# Patient Record
Sex: MALE
[De-identification: names, ages, dates, MRNs, and addresses within clinical notes are randomized per-mention and may not be internally consistent; named-entity substitution may affect disease eponyms.]

---

## 2019-03-05 PROBLEM — Z00.00 ENCOUNTER FOR PREVENTIVE HEALTH EXAMINATION: Status: ACTIVE | Noted: 2019-03-05

## 2019-04-02 ENCOUNTER — APPOINTMENT (OUTPATIENT)
Dept: OTOLARYNGOLOGY | Facility: CLINIC | Age: 49
End: 2019-04-02
Payer: COMMERCIAL

## 2019-04-02 DIAGNOSIS — Z85.9 PERSONAL HISTORY OF MALIGNANT NEOPLASM, UNSPECIFIED: ICD-10-CM

## 2019-04-02 DIAGNOSIS — C73 MALIGNANT NEOPLASM OF THYROID GLAND: ICD-10-CM

## 2019-04-02 PROCEDURE — 99213 OFFICE O/P EST LOW 20 MIN: CPT | Mod: 25

## 2019-04-02 RX ORDER — LEVOTHYROXINE SODIUM 0.17 MG/1
TABLET ORAL
Refills: 0 | Status: ACTIVE | COMMUNITY

## 2019-04-02 RX ORDER — LISINOPRIL 30 MG/1
TABLET ORAL
Refills: 0 | Status: ACTIVE | COMMUNITY

## 2019-04-02 NOTE — CONSULT LETTER
[Dear  ___] : Dear  [unfilled], [Courtesy Letter:] : I had the pleasure of seeing your patient, [unfilled], in my office today. [Please see my note below.] : Please see my note below. [Sincerely,] : Sincerely, [FreeTextEntry3] : Joe Oneil MD

## 2019-04-02 NOTE — PROCEDURE
[de-identified] : PROCEDURE: Flexible laryngoscopy\par SURGEON: Dr. Oneil\par INDICATIONS: He was unable to tolerate a mirror exam. Assess for laryngopharynx pharyngeal reflux. cough. head and neck mass. \par ANESTHESIA: The patient was placed in a sitting position.  Following application of the topical anesthetic and decongestant, exam was performed with a flexible scope.  The scope was passed along the right nasal floor to the nasopharynx.  It was then passed into the region of the middle meatus, middle turbinate, and sphenoethmoid region.  An identical procedure was performed on the left side.  The following findings were noted:\par \par The nasal musoca was healthy appearing and the septum was roughly midline. The middle meatus and sphenoethmoid recesses were clear bilaterally. The nasopharynx \par \par Nasopharynx: no masses, choanae patent, no adenoid tissue\par \par Base of tongue/vallecula: no masses or asymmetry\par Pharyngeal walls: symmetrical. No masses\par Pyriform sinuses: no lesions or pooling of secretions\par Epiglottis: normal. No edema or lesions\par Aryepiglottic folds: normal. No lesions. \par Vocal cords: clear and mobile. No lesions. Airway patent\par Arytenoids: no edema or erythema \par Interarytenoid area: no edema, erythema or lesion.\par \par

## 2019-04-02 NOTE — HISTORY OF PRESENT ILLNESS
[de-identified] : This gentleman is well-known to me. He is now approximately 1-1/2 years status post total thyroidectomy for a stage III papillary thyroid adenocarcinoma. He is without any complaints. He did undergo radioactive iodine ablation.\par I reviewed a recent sonogram from February of 2019 which did not demonstrate any concerning lymphadenopathy or any tissue in the thyroid bed\par He is followed and careful endocrinology care by Dr. Leslie Geiger. I do not have any recent blood work but according to him recent blood work was performed and thought to be satisfactory

## 2019-04-02 NOTE — ASSESSMENT
[FreeTextEntry1] : He is clinically stable without any evidence of disease at this time. I will make arrangements to review his recent serology. He will continue to follow an endocrinology care. He was seen in followup in 6 months time or as needed.

## 2019-04-04 ENCOUNTER — APPOINTMENT (OUTPATIENT)
Age: 49
End: 2019-04-04
Payer: COMMERCIAL

## 2019-04-04 ENCOUNTER — APPOINTMENT (OUTPATIENT)
Dept: OTOLARYNGOLOGY | Facility: CLINIC | Age: 49
End: 2019-04-04
Payer: COMMERCIAL

## 2019-04-04 DIAGNOSIS — H90.3 SENSORINEURAL HEARING LOSS, BILATERAL: ICD-10-CM

## 2019-04-04 PROCEDURE — 92557 COMPREHENSIVE HEARING TEST: CPT

## 2019-04-04 PROCEDURE — 99213 OFFICE O/P EST LOW 20 MIN: CPT

## 2019-04-04 PROCEDURE — 92567 TYMPANOMETRY: CPT

## 2019-04-04 NOTE — CONSULT LETTER
[Dear  ___] : Dear  [unfilled], [Courtesy Letter:] : I had the pleasure of seeing your patient, [unfilled], in my office today. [Please see my note below.] : Please see my note below. [Consult Closing:] : Thank you very much for allowing me to participate in the care of this patient.  If you have any questions, please do not hesitate to contact me. [Sincerely,] : Sincerely, [FreeTextEntry3] : Mary Strange MD\par

## 2019-04-04 NOTE — HISTORY OF PRESENT ILLNESS
[de-identified] : Chip Hinton is a very pleasant 48-year-old patient with a history of thyroid cancer and bilateral sensorineural hearing loss. I saw him in August of 2017 for hearing loss. He had bilateral hearing loss. I also found an incidental right thyroid mass. He underwent workup and was found to have thyroid cancer. He underwent thyroidectomy with Dr. Oneil followed by radioactive iodine ablation. He saw Dr. Oneil recently. He is also followed by an endocrinologist. He denies tinnitus, otalgia, otorrhea, or dizziness.\par \par He has no history of recurrent ear infections or prior otologic surgery. He did have some noise exposure when younger from going to concerts. His last hearing test was in 2017.

## 2019-04-04 NOTE — ASSESSMENT
[FreeTextEntry1] : He has a history of bilateral sensorineural hearing loss. We reviewed his audiogram\par \par PLAN\par \par -findings and management options discussed in detail with the patient. \par -good aural hygiene\par -avoid using cotton swabs in the ears\par -noise precautions\par -annual audiogram\par -consider HAE if he has difficulty hearing\par -follow up in one year to check his hearing\par -call and return earlier if any concerns. \par

## 2019-05-13 ENCOUNTER — APPOINTMENT (OUTPATIENT)
Dept: OTOLARYNGOLOGY | Facility: CLINIC | Age: 49
End: 2019-05-13

## 2020-06-04 ENCOUNTER — TRANSCRIPTION ENCOUNTER (OUTPATIENT)
Age: 50
End: 2020-06-04